# Patient Record
Sex: MALE | Race: WHITE
[De-identification: names, ages, dates, MRNs, and addresses within clinical notes are randomized per-mention and may not be internally consistent; named-entity substitution may affect disease eponyms.]

---

## 2020-12-29 ENCOUNTER — HOSPITAL ENCOUNTER (OUTPATIENT)
Dept: HOSPITAL 62 - ER | Age: 70
Setting detail: OBSERVATION
LOS: 2 days | Discharge: HOME | End: 2020-12-31
Attending: INTERNAL MEDICINE | Admitting: STUDENT IN AN ORGANIZED HEALTH CARE EDUCATION/TRAINING PROGRAM
Payer: MEDICARE

## 2020-12-29 DIAGNOSIS — Z79.899: ICD-10-CM

## 2020-12-29 DIAGNOSIS — I25.2: ICD-10-CM

## 2020-12-29 DIAGNOSIS — M62.81: ICD-10-CM

## 2020-12-29 DIAGNOSIS — Z79.82: ICD-10-CM

## 2020-12-29 DIAGNOSIS — I10: ICD-10-CM

## 2020-12-29 DIAGNOSIS — I63.9: Primary | ICD-10-CM

## 2020-12-29 DIAGNOSIS — E11.9: ICD-10-CM

## 2020-12-29 DIAGNOSIS — R20.0: ICD-10-CM

## 2020-12-29 DIAGNOSIS — I42.9: ICD-10-CM

## 2020-12-29 DIAGNOSIS — I25.10: ICD-10-CM

## 2020-12-29 DIAGNOSIS — Z95.1: ICD-10-CM

## 2020-12-29 DIAGNOSIS — I48.91: ICD-10-CM

## 2020-12-29 LAB
ADD MANUAL DIFF: NO
ALBUMIN SERPL-MCNC: 4.4 G/DL (ref 3.5–5)
ALP SERPL-CCNC: 45 U/L (ref 38–126)
ANION GAP SERPL CALC-SCNC: 11 MMOL/L (ref 5–19)
AST SERPL-CCNC: 36 U/L (ref 17–59)
BASOPHILS # BLD AUTO: 0.1 10^3/UL (ref 0–0.2)
BASOPHILS NFR BLD AUTO: 0.6 % (ref 0–2)
BILIRUB DIRECT SERPL-MCNC: 0.2 MG/DL (ref 0–0.4)
BILIRUB SERPL-MCNC: 0.8 MG/DL (ref 0.2–1.3)
BUN SERPL-MCNC: 15 MG/DL (ref 7–20)
CALCIUM: 10.2 MG/DL (ref 8.4–10.2)
CHLORIDE SERPL-SCNC: 105 MMOL/L (ref 98–107)
CO2 SERPL-SCNC: 26 MMOL/L (ref 22–30)
EOSINOPHIL # BLD AUTO: 0.1 10^3/UL (ref 0–0.6)
EOSINOPHIL NFR BLD AUTO: 1 % (ref 0–6)
ERYTHROCYTE [DISTWIDTH] IN BLOOD BY AUTOMATED COUNT: 13.7 % (ref 11.5–14)
GLUCOSE SERPL-MCNC: 97 MG/DL (ref 75–110)
HCT VFR BLD CALC: 44.8 % (ref 37.9–51)
HGB BLD-MCNC: 15 G/DL (ref 13.5–17)
INR PPP: 1.02
LYMPHOCYTES # BLD AUTO: 1.9 10^3/UL (ref 0.5–4.7)
LYMPHOCYTES NFR BLD AUTO: 20.7 % (ref 13–45)
MCH RBC QN AUTO: 30.5 PG (ref 27–33.4)
MCHC RBC AUTO-ENTMCNC: 33.5 G/DL (ref 32–36)
MCV RBC AUTO: 91 FL (ref 80–97)
MONOCYTES # BLD AUTO: 0.6 10^3/UL (ref 0.1–1.4)
MONOCYTES NFR BLD AUTO: 6.5 % (ref 3–13)
NEUTROPHILS # BLD AUTO: 6.6 10^3/UL (ref 1.7–8.2)
NEUTS SEG NFR BLD AUTO: 71.2 % (ref 42–78)
PLATELET # BLD: 230 10^3/UL (ref 150–450)
POTASSIUM SERPL-SCNC: 4.1 MMOL/L (ref 3.6–5)
PROT SERPL-MCNC: 7.7 G/DL (ref 6.3–8.2)
PROTHROMBIN TIME: 13.6 SEC (ref 11.4–15.4)
RBC # BLD AUTO: 4.91 10^6/UL (ref 4.35–5.55)
TOTAL CELLS COUNTED % (AUTO): 100 %
WBC # BLD AUTO: 9.3 10^3/UL (ref 4–10.5)

## 2020-12-29 PROCEDURE — 85025 COMPLETE CBC W/AUTO DIFF WBC: CPT

## 2020-12-29 PROCEDURE — 85610 PROTHROMBIN TIME: CPT

## 2020-12-29 PROCEDURE — 71046 X-RAY EXAM CHEST 2 VIEWS: CPT

## 2020-12-29 PROCEDURE — 93010 ELECTROCARDIOGRAM REPORT: CPT

## 2020-12-29 PROCEDURE — 36415 COLL VENOUS BLD VENIPUNCTURE: CPT

## 2020-12-29 PROCEDURE — 70551 MRI BRAIN STEM W/O DYE: CPT

## 2020-12-29 PROCEDURE — 93306 TTE W/DOPPLER COMPLETE: CPT

## 2020-12-29 PROCEDURE — 99285 EMERGENCY DEPT VISIT HI MDM: CPT

## 2020-12-29 PROCEDURE — 70450 CT HEAD/BRAIN W/O DYE: CPT

## 2020-12-29 PROCEDURE — 97116 GAIT TRAINING THERAPY: CPT

## 2020-12-29 PROCEDURE — 83036 HEMOGLOBIN GLYCOSYLATED A1C: CPT

## 2020-12-29 PROCEDURE — 84484 ASSAY OF TROPONIN QUANT: CPT

## 2020-12-29 PROCEDURE — 93880 EXTRACRANIAL BILAT STUDY: CPT

## 2020-12-29 PROCEDURE — 93005 ELECTROCARDIOGRAM TRACING: CPT

## 2020-12-29 PROCEDURE — 97161 PT EVAL LOW COMPLEX 20 MIN: CPT

## 2020-12-29 PROCEDURE — G0378 HOSPITAL OBSERVATION PER HR: HCPCS

## 2020-12-29 PROCEDURE — 80053 COMPREHEN METABOLIC PANEL: CPT

## 2020-12-29 PROCEDURE — 97165 OT EVAL LOW COMPLEX 30 MIN: CPT

## 2020-12-29 PROCEDURE — 80061 LIPID PANEL: CPT

## 2020-12-29 PROCEDURE — 82962 GLUCOSE BLOOD TEST: CPT

## 2020-12-29 PROCEDURE — 81001 URINALYSIS AUTO W/SCOPE: CPT

## 2020-12-29 NOTE — EKG REPORT
SEVERITY:- ABNORMAL ECG -

ATRIAL FIBRILLATION, V-RATE 51-87

LEFT BUNDLE BRANCH BLOCK

:

Confirmed by: Gregory Nichols MD 29-Dec-2020 21:36:18

## 2020-12-29 NOTE — ER DOCUMENT REPORT
ED Medical Screen (RME)





- General


Chief Complaint: Weakness


Stated Complaint: RIGHT LEG WEAKNESS, DIZZINESS


Time Seen by Provider: 12/29/20 16:41


Mode of Arrival: Wheelchair


Information source: Patient


Notes: 





HPI; 70-year-old male presents to the emergency room complaining of a cough for 

the past 3 days.  States today he started having some dizziness earlier today 

states he felt like the room was spinning.  States while walking to the bathroom

he felt very uneasy on his feet states he stumbled hit his right arm on a wall 

but caught himself from falling.  States ever since he has felt like his right 

leg is weak but denies pain.  States he does have a history of peripheral 

vascular disease to the right leg.  Is on blood thinners secondary to CABG.  Has

not missed any of his medications.  He denies any nausea, vomiting no chest 

pain, no shortness of breath no difficulty breathing.  States he is able to walk

but feels off balance when he walks.  He denies any COVID-19 exposure.





PE: Alert and oriented x3.  Negative fast exam.  Lungs: Clear to auscultation 

without rales, rhonchi, wheezes.  Heart: Regular rate rhythm without murmurs, 

rubs, gallops.  Able to fully lift and extend right leg without difficulty.  

Full sensation to painful stimuli bilateral upper and lower extremities.








I have greeted and performed a rapid initial assessment of this patient.  A 

comprehensive ED assessment and evaluation of the patient, analysis of test 

results and completion of the medical decision making process will be conducted 

by additional ED providers.  I have specifically instructed the patient or 

family members with the patient to immediately return to any nursing staff 

should anything change in the patient's condition or with their chief complaint.


TRAVEL OUTSIDE OF THE [place] IN LAST 30 DAYS: No





- Related Data


Allergies/Adverse Reactions: 


                                        





No Known Allergies Allergy (Unverified 12/29/20 16:33)


   











Physical Exam





- Vital signs


Vitals: 


                                        











Temp Pulse Resp BP Pulse Ox


 


 98.1 F   72   16   140/77 H  97 


 


 12/29/20 16:27  12/29/20 16:27  12/29/20 16:27  12/29/20 16:27  12/29/20 16:27














Course





- Vital Signs


Vital signs: 


                                        











Temp Pulse Resp BP Pulse Ox


 


 98.1 F   72   16   140/77 H  97 


 


 12/29/20 16:27  12/29/20 16:27  12/29/20 16:27  12/29/20 16:27  12/29/20 16:27

## 2020-12-29 NOTE — RADIOLOGY REPORT (SQ)
EXAM DESCRIPTION:  CT HEAD WITHOUT



IMAGES COMPLETED DATE/TIME:  12/29/2020 5:03 pm



REASON FOR STUDY:  dizzy



COMPARISON:  None.



TECHNIQUE:  Axial images acquired through the brain without intravenous contrast.  Images reviewed wi
th bone, brain and subdural windows.  Additional sagittal and coronal reconstructions were generated.
 Images stored on PACS.

All CT scanners at this facility use dose modulation, iterative reconstruction, and/or weight based d
osing when appropriate to reduce radiation dose to as low as reasonably achievable (ALARA).

[facility]: Dose Right  [facility]: CareDose    [facility]: Dose Right    [facility]: Teradose 4D    [facility]: Smart Tracksmith



RADIATION DOSE:  CT Rad equipment meets quality standard of care and radiation dose reduction techniq
ues were employed. CTDIvol: 53.2 mGy. DLP: 964 mGy-cm. mGy.



LIMITATIONS:  None.



FINDINGS:  VENTRICLES: Prominent.

CEREBRUM: No masses.  No hemorrhage.  No midline shift.  Areas of low density in the white matter mos
t likely due to chronic micro-vascular ischemic change.  No evidence for acute infarction.

CEREBELLUM: No masses.  No hemorrhage.  Focal encephalomalacia involving the right inferior cerebella
r hemisphere, consistent with sequela of remote ischemic injury.  No evidence for acute infarction.

EXTRAAXIAL SPACES: Mild age-related involutional change.  No fluid collections.  No masses.

ORBITS AND GLOBE: No intra- or extraconal masses.  Normal contour of globe without masses.

CALVARIUM: No fracture.

PARANASAL SINUSES: No fluid or mucosal thickening.

SOFT TISSUES: No mass or hematoma.

OTHER: No other significant finding.



IMPRESSION:  No acute intracranial abnormality.  Right cerebellar encephalomalacia, consistent with s
equela previous ischemic injury.  Background of mild chronic microvascular and age-related involution
al change.

EVIDENCE OF ACUTE STROKE: NO.



TECHNICAL DOCUMENTATION:  JOB ID:  [id number]

Quality ID # 436: Final reports with documentation of one or more dose reduction techniques (e.g., Au
tomated exposure control, adjustment of the mA and/or kV according to patient size, use of iterative 
reconstruction technique)

 2011 [employer]- All Rights Reserved



Reading location - IP/workstation name: [email or web address]

## 2020-12-29 NOTE — RADIOLOGY REPORT (SQ)
EXAM DESCRIPTION:  CHEST 2 VIEWS



IMAGES COMPLETED DATE/TIME:  12/29/2020 5:21 pm



REASON FOR STUDY:  cough



COMPARISON:  None.



EXAM PARAMETERS:  NUMBER OF VIEWS: two views

TECHNIQUE: Digital Frontal and Lateral radiographic views of the chest acquired.

RADIATION DOSE: NA

LIMITATIONS: none



FINDINGS:  LUNGS AND PLEURA: No opacities, masses or pneumothorax. No pleural effusion.

MEDIASTINUM AND HILAR STRUCTURES: Ectatic ascending aorta.

HEART AND VASCULAR STRUCTURES: Heart normal size.  No evidence for failure.

BONES: No acute findings.

HARDWARE: Midline surgical changes.

OTHER: No other significant finding.



IMPRESSION:  No evidence of acute cardiopulmonary abnormality.



TECHNICAL DOCUMENTATION:  JOB ID:  [id number]

 2011 [employer]- All Rights Reserved



Reading location - IP/workstation name: [email or web address]

## 2020-12-30 LAB
APPEARANCE UR: CLEAR
APTT PPP: YELLOW S
BILIRUB UR QL STRIP: NEGATIVE
CHOLEST SERPL-MCNC: 170.18 MG/DL (ref 0–200)
GLUCOSE UR STRIP-MCNC: >=500 MG/DL
KETONES UR STRIP-MCNC: NEGATIVE MG/DL
LDLC SERPL DIRECT ASSAY-MCNC: 135 MG/DL (ref ?–100)
NITRITE UR QL STRIP: NEGATIVE
PH UR STRIP: 5 [PH] (ref 5–9)
PROT UR STRIP-MCNC: NEGATIVE MG/DL
SP GR UR STRIP: 1.03
TRIGL SERPL-MCNC: 123 MG/DL (ref ?–150)
UROBILINOGEN UR-MCNC: NEGATIVE MG/DL (ref ?–2)
VLDLC SERPL CALC-MCNC: 25 MG/DL (ref 10–31)

## 2020-12-30 RX ADMIN — INSULIN LISPRO SCH: 100 INJECTION, SOLUTION INTRAVENOUS; SUBCUTANEOUS at 13:00

## 2020-12-30 RX ADMIN — ENALAPRIL MALEATE SCH: 5 TABLET ORAL at 22:53

## 2020-12-30 RX ADMIN — METOPROLOL SUCCINATE SCH: 25 TABLET, EXTENDED RELEASE ORAL at 22:53

## 2020-12-30 RX ADMIN — FAMOTIDINE SCH MG: 20 TABLET, FILM COATED ORAL at 11:55

## 2020-12-30 RX ADMIN — INSULIN LISPRO SCH: 100 INJECTION, SOLUTION INTRAVENOUS; SUBCUTANEOUS at 16:19

## 2020-12-30 RX ADMIN — INSULIN LISPRO SCH: 100 INJECTION, SOLUTION INTRAVENOUS; SUBCUTANEOUS at 10:02

## 2020-12-30 RX ADMIN — CILOSTAZOL SCH: 100 TABLET ORAL at 22:53

## 2020-12-30 RX ADMIN — FAMOTIDINE SCH: 20 TABLET, FILM COATED ORAL at 22:53

## 2020-12-30 RX ADMIN — ENOXAPARIN SODIUM SCH MG: 40 INJECTION SUBCUTANEOUS at 11:55

## 2020-12-30 RX ADMIN — INSULIN LISPRO SCH: 100 INJECTION, SOLUTION INTRAVENOUS; SUBCUTANEOUS at 22:52

## 2020-12-30 RX ADMIN — MEMANTINE HYDROCHLORIDE SCH: 10 TABLET, FILM COATED ORAL at 19:24

## 2020-12-30 RX ADMIN — ASPIRIN SCH MG: 81 TABLET, CHEWABLE ORAL at 11:55

## 2020-12-30 NOTE — XCELERA REPORT
[address] [address]

                               Tel: [phone]

                               Fax: [phone]



                      Transthoracic Echocardiogram Report

_______________________________________________________________________________



Name: AMANDA GALVAN

MRN: [id number]                           Age: 70 yrs

Gender: Male                              : 1950

Patient Status: Inpatient                 Patient Location: [address] [place] [address]

Account #: [id number]

Study Date: 2020 01:39 PM

History: SHARIF

Accession #: [id number]

_______________________________________________________________________________



Height: 69 in        Weight: 180 lb        BSA: 2.0 m2

_______________________________________________________________________________

Procedure: A complete two-dimensional transthoracic echocardiogram was

performed (2D, M-mode, spectral and color flow Doppler). The study was

technically difficult with many images being suboptimal in quality.

Reason For Study: TIA

Previous Evaluation: No previous studies were available.

History: TIA.



Ordering Physician: MED LEGGETT

Performed By: Dejah Shabazz

_______________________________________________________________________________



Interpretation Summary

Left ventricular systolic function is mild to moderately reduced.

The Ejection Fraction estimate is 35-40%

The right ventricular systolic function is normal.

There is a mild amount of mitral regurgitation

A bicuspid aortic valve cannot be excluded.

There is no aortic valve stenosis

There is a trace amount of aortic regurgitation

There is a trace amount of tricuspid regurgitation

There is no pericardial effusion.



MMode/2D Measurements & Calculations



RVDd: 2.4 cm  LVIDd: 6.4 cm   FS: 20.1 %             Ao root diam: 3.3 cm

IVSd: 1.2 cm  LVIDs: 5.1 cm   EDV(Teich): 206.9 ml   Ao root area: 8.7 cm2

              LVPWd: 1.1 cm   ESV(Teich): 123.6 ml

                              EF(Teich): 40.3 %



Doppler Measurements & Calculations

MV E max opal:       MV dec slope:        Ao V2 max:         LV V1 max P.2 cm/sec         502.3 cm/sec2        107.9 cm/sec       1.8 mmHg

                    MV dec time: 0.16 secAo max PG:         LV V1 max:

                                         4.7 mmHg           67.4 cm/sec

        _______________________________________________________________

PA V2 max:

66.5 cm/sec

PA max P.8 mmHg





Left Ventricle

The left ventricle is moderately dilated. There is mild to moderate concentric

left ventricular hypertrophy. Left ventricular systolic function is mild to

moderately reduced. The Ejection Fraction estimate is 35-40%. There is

anterior wall akinesis. There is proximal anterior wall akinesis. There is mid

to distal anterior wall akinesis.



Right Ventricle

The right ventricle is grossly normal size. The right ventricular systolic

function is normal.



Atria

The right atrium is mildly dilated. The left atrium is mildly dilated. The

interatrial septum is intact with no evidence for an atrial septal defect.

There is no Doppler evidence for an interatrial shunt.



Mitral Valve

The mitral valve is grossly normal. There is no mitral valve stenosis. There

is a mild amount of mitral regurgitation.





Aortic Valve

A bicuspid aortic valve cannot be excluded. There is no aortic valve stenosis.

There is a trace amount of aortic regurgitation.



Tricuspid Valve

The tricuspid valve is normal in structure and function. There is a trace

amount of tricuspid regurgitation. Tricuspid regurgitation jet envelope not

well defined to measure RV systolic pressure accurately.



Pulmonic Valve

The pulmonic valve is normal in structure and function. There is no pulmonic

valvular stenosis. There is a trace or physiologic amount of pulmonic

regurgitation.



Great Vessels

The aortic root is normal size. The inferior vena cava appeared normal and

decreased < 50% with respiration (RAP 10-15 mmHg).





Effusions

There is no pericardial effusion.



_______________________________________________________________________________

_______________________________________________________________________________



Electronically signed by:      Gregory Nichols      on 2020 06:29 PM



CC: MDE LEGGETT Anil

## 2020-12-30 NOTE — PDOC H&P
History of Present Illness


Admission Date/PCP: 


  





  JEVON TOTH





Patient complains of: Dizziness, right lower extremity heaviness and numbness


History of Present Illness: 


AMANDA GALVAN is a 70 year old male with a history of CAD status post CABG in 

2006, type 2 diabetes, hypertension who now presents reporting dizziness which 

started around 9 AM yesterday morning.  Associated with this patient also 

reports that he felt some weakness/heaviness of the right lower extremity.  He 

also endorsed numbness and tingling on the right lower extremity.  He states 

that the weakness of his right lower extremity lasted for about an hour and 

resolved spontaneously but he continued having dizziness which he describes as 

floating of his head and loss of balance.  And when he was trying to walk to the

bathroom, he lost his balance and fell down but he denies any passing out or 

loss of consciousness and he did not hit his head.  He denies any change in his 

speech and does not have any difficulty of swallowing.  Patient denies any 

similar symptoms in the past.  He denies chest pain, shortness of breath, 

orthopnea, PND, palpitation, loss of control of bowel or bladder function.  

Currently all symptoms have resolved.








Past Medical History


Cardiac Medical History: Reports: Atrial Fibrillation, Coronary Artery Disease, 

Myocardial Infarction, Hypertension


Pulmonary Medical History: Reports: None


EENT Medical History: Reports: None


Neurological Medical History: Reports: None


Endocrine Medical History: Reports: None, Diabetes Mellitus Type 2


Renal/ Medical History: Reports: None


Malignancy Medical History: Reports: None


GI Medical History: Reports: None


Musculoskeltal Medical History: Reports: None


Skin Medical History: Reports: None


Psychiatric Medical History: Reports: None


Traumatic Medical History: Reports: None


Infectious Medical History: Reports: None





Past Surgical History


Past Surgical History: Reports: None





Social History


Information Source: Patient


Lives with: Family


Smoking Status: Never Smoker


Frequency of Alcohol Use: None


Hx Recreational Drug Use: No


Drugs: None





- Advance Directive


Resuscitation Status: Full Code





Family History


Family History: Reviewed & Not Pertinent


Parental Family History Reviewed: Yes


Children Family History Reviewed: Yes


Sibling(s) Family History Reviewed.: Yes





Medication/Allergy


Allergies/Adverse Reactions: 


                                        





morphine Adverse Reaction (Verified 12/30/20 06:40)


   Irritability











Review of Systems


Constitutional: ABSENT: chills, fever(s), headache(s), weight gain, weight loss


Eyes: ABSENT: visual disturbances


Ears: ABSENT: hearing changes


Nose, Mouth, and Throat: ABSENT: headache(s), mouth pain, sore throat


Cardiovascular: PRESENT: as per HPI


Respiratory: ABSENT: cough, hemoptysis


Gastrointestinal: ABSENT: abdominal pain, constipation, diarrhea, hematemesis, 

hematochezia, nausea, vomiting


Genitourinary: ABSENT: dysuria, hematuria


Musculoskeletal: ABSENT: joint swelling


Integumentary: ABSENT: rash, wounds


Neurological: PRESENT: as per HPI


Psychiatric: ABSENT: anxiety, depression, homidical ideation, suicidal ideation


Endocrine: ABSENT: cold intolerance, heat intolerance, polydipsia, polyuria


Hematologic/Lymphatic: ABSENT: easy bleeding, easy bruising





Physical Exam


Vital Signs: 


                                        











Temp Pulse Resp BP Pulse Ox


 


 98.1 F   67   13   137/85 H  97 


 


 12/30/20 04:05  12/30/20 03:00  12/30/20 05:01  12/30/20 05:00  12/30/20 05:01











Additional comments: 





GENERAL APPEARANCE: Alert and oriented x3, in no acute distress


HEENT: Normocephalic and atraumatic. No scleral icterus.  Moist oral mucosa


NECK: Supple. No lymphadenopathy or tenderness. No carotid bruit. No JVD


CHEST: Symmetric. Nontender to palpation.


LUNGS: Clear with good air entry bilaterally. No wheezing or crackles 


HEART: Regular rate and rhythm with normal S1 and S2. No murmurs, gallops, or 

rubs.


ABDOMEN: soft, active bowel sounds, no direct or rebound tenderness.  No 

organomegaly detected.  


EXTREMITIES: No cyanosis, clubbing, or edema.


MUSCULOSKELETAL: No deformity, atrophy or swelling noted


PSYCHIATRIC:  Recent and remote memory is intact. Appropriate mood and affect.


SKIN: Warm, dry, and well perfused. No lesions or rashes are noted. 


NEUROLOGIC: Cranial nerves II through XII were grossly intact


                   Motor: Power was 5/5 in all 4 extremities


                  Sensory: Intact sensation to light and deep touch in all 

extremities symmetrically


                  Reflex: +2 symmetrically in both upper and lower extremities 

at knee, ankle biceps and wrist











Results


Laboratory Results: 


                                        





                                 12/29/20 16:02 





                                 12/29/20 16:02 





                                        











  12/29/20 12/29/20 12/29/20





  16:02 16:02 22:44


 


WBC  9.3  


 


RBC  4.91  


 


Hgb  15.0  


 


Hct  44.8  


 


MCV  91  


 


MCH  30.5  


 


MCHC  33.5  


 


RDW  13.7  


 


Plt Count  230  


 


Seg Neutrophils %  71.2  


 


Sodium   141.5 


 


Potassium   4.1 


 


Chloride   105 


 


Carbon Dioxide   26 


 


Anion Gap   11 


 


BUN   15 


 


Creatinine   0.95 


 


Est GFR ( Amer)   > 60 


 


Glucose   97 


 


Calcium   10.2 


 


Total Bilirubin   0.8 


 


AST   36 


 


Alkaline Phosphatase   45 


 


Total Protein   7.7 


 


Albumin   4.4 


 


Urine Color    YELLOW


 


Urine Appearance    CLEAR


 


Urine pH    5.0


 


Ur Specific Gravity    1.028


 


Urine Protein    NEGATIVE


 


Urine Glucose (UA)    >=500 H


 


Urine Ketones    NEGATIVE


 


Urine Blood    NEGATIVE


 


Urine Nitrite    NEGATIVE


 


Ur Leukocyte Esterase    NEGATIVE


 


Urine WBC (Auto)    1


 


Urine RBC (Auto)    1








                                        











  12/29/20





  16:02


 


Troponin I  0.014











Impressions: 


                                        





Chest X-Ray  12/29/20 16:49


IMPRESSION:  No evidence of acute cardiopulmonary abnormality.


 








Head CT  12/29/20 16:49


IMPRESSION:  No acute intracranial abnormality.  Right cerebellar 

encephalomalacia, consistent with sequela previous ischemic injury.  Background 

of mild chronic microvascular and age-related involutional change.


EVIDENCE OF ACUTE STROKE: NO.


 














Assessment and Plan





- Diagnosis


(1) TIA (transient ischemic attack)


Is this a current diagnosis for this admission?: Yes   


Plan: 


Patient presents with dizziness and right lower extremity weakness which has 

resolved since


Was out of window period On presentation


Head CT without contrast showed no acute intracranial changes


Obtain MRI head, carotid Doppler and echocardiogram


Kept him n.p.o. until patient completes swallow eval


PT/OT/speech evaluation consult placed


Placed him on telemetry monitoring


Fall precaution, neurochecks, aspiration precaution


Will get lipid panel in the a.m.


Started him on aspirin and atorvastatin











(2) New onset a-fib


Is this a current diagnosis for this admission?: Yes   


Plan: 


Patient denies any history of A. fib in the past


Maybe contributing to his current symptoms including dizziness and strokelike 

symptoms


EKG on this presentation shows A. fib which is rate controlled


Has a [id number] 2 score of 4


We will obtain echo cardiogram in the morning


Likely to be started on anticoagulation before discharge











(3) Type 2 diabetes mellitus


Is this a current diagnosis for this admission?: Yes   


Plan: 


On diabetic diet


Sliding scale insulin, Accu-Chek and hypoglycemia protocol








(4) Hypertension


Is this a current diagnosis for this admission?: Yes   


Plan: 


Blood pressure is within acceptable range


Hold off BP medications for now to allow permissive hypertension











(5) CAD (coronary artery disease)


Is this a current diagnosis for this admission?: Yes   


Plan: 


Currently denies any chest pain


Continue aspirin and statin








- Time


Time Spent with patient: 35 or more minutes


Total Critical Time (Minutes): 45


Medications reviewed and adjusted accordingly: Yes


Anticipated Discharge Disposition: Home, Self Care


Anticipated Discharge Timeframe: within 48 hours





- Inpatient Certification


Based on my medical assessment, after consideration of the patient's 

comorbidities, presenting symptoms, or acuity I expect that the services needed 

warrant INPATIENT care.: Yes


I certify that my determination is in accordance with my understanding of 

Medicare's requirements for reasonable and necessary INPATIENT services [42 CFR 

412.3e].: Yes


Medical Necessity: Significant Comorbidiites Make Outpatient Treatment Too Ris

ky, Need Close Monitoring Due to Risk of Patient Decompensation, Need For 

Continuous Telemetry Monitoring, Need for Neurological Checks


Post Hospital Care: D/C or Transfer Summary

## 2020-12-30 NOTE — RADIOLOGY REPORT (SQ)
EXAM DESCRIPTION:  CAROTID DOPPLER



IMAGES COMPLETED DATE/TIME:  12/30/2020 3:20 pm



REASON FOR STUDY:  Transient ischemic attack



COMPARISON:  None.



TECHNIQUE:  Grayscale ultrasound, Doppler velocity and spectra, and color Doppler images acquired of 
the extra-cranial carotid and vertebral arteries. Images stored on PACS.



LIMITATIONS:  None.



FINDINGS:  RIGHT CAROTID

CCA Velocities: Within normal limits.

ICA Velocities

 Peak systolic 71  cm/s.

 End diastolic 23  cm/s.

Proximal ICA/CCA peak systolic ratio 1.2.

Spectra normal. No significant plaque.

LEFT CAROTID

CCA Velocities: Within normal limits.

ICA Velocities

 Peak systolic 91  cm/s.

 End diastolic 20  cm/s.

Proximal ICA/CCA peak systolic ratio 1.1.

Spectra normal. No significant plaque.

VERTEBRAL ARTERIES: Antegrade flow.  Normal waveforms.

SUBCLAVIAN ARTERIES: No finding.

OTHER: Tortuous internal carotid arteries bilaterally.



IMPRESSION:  Tortuous ICAs.  No hemodynamically significant stenosis.



COMMENT:  Quality ID #195:  Velocity criteria are extrapolated from the diameter data as defined by t
he Society of Radiologists in Ultrasound Consensus Conference. Radiology 2003: 229; 340-346.



TECHNICAL DOCUMENTATION:  JOB ID:  [id number]

 2011 [employer]- All Rights Reserved



Reading location - IP/workstation name: [email or web address]

## 2020-12-30 NOTE — RADIOLOGY REPORT (SQ)
EXAM DESCRIPTION:  MRI HEAD WITHOUT



IMAGES COMPLETED DATE/TIME:  12/30/2020 7:58 am



REASON FOR STUDY:  TIA



COMPARISON:  CT brain dated 12/29/2020



TECHNIQUE:  Multiplanar imaging includes non-contrasted T1, T2, FLAIR, and diffusion with ADC map seq
uences.  Images stored on PACS.



LIMITATIONS:  None.



FINDINGS:  ANATOMY: No anomalies. Normal vascular flow voids. Pituitary fossa normal.

CSF SPACES: Atrophy induced prominence of ventricles and CSF spaces.

CEREBRUM: Scattered periventricular and subcortical white matter lesions consistent with small vessel
 disease.  No focal mass or intracranial hemorrhage.

POSTERIOR FOSSA: Old left cerebellar infarct.

DIFFUSION IMAGING: Focal small 3 to 4 mm area of high signal intensity on diffusion-weighted images w
ith corresponding decreased signal intensity on apparent diffusion images consistent with small infar
ct.  This is best demonstrated on series 4, image 20 of 54 and series 400, image 20 of 27.

ORBITS: No masses. Globes normal.

PARANASAL  SINUSES: No fluid levels.  Mucosa normal.

OTHER: No other significant finding.



IMPRESSION:  1. Small acute infarct in the left centrum semiovale.  This measures only 3 to 4 mm in g
reatest diameter.  It demonstrates abnormal signal on diffusion weighted sequences.

2.  Diffuse cerebral atrophy and mild small vessel ischemic changes.

EVIDENCE OF ACUTE STROKE: YES.



TECHNICAL DOCUMENTATION:  JOB ID:  [id number]

 [employer]- All Rights Reserved



Reading location - IP/workstation name: [email or web address]

## 2020-12-30 NOTE — ER DOCUMENT REPORT
ED General





- General


Chief Complaint: Numbness


Stated Complaint: RIGHT LEG WEAKNESS, DIZZINESS


Time Seen by Provider: 12/29/20 16:41


Mode of Arrival: Wheelchair


Information source: Patient


Notes: 





Patient presents to the ER for evaluation of sudden onset of dizziness with poor

depth perception and right leg heaviness that began approximately 1 hour prior 

to arrival.  According to the patient, the symptoms had resolved by the time he 

got to the hospital.  The pit note indicates that he felt like the room was 

spinning but he tells me that this is not the case.  He states he feels as if 

the room was moving up and down rapidly.  The patient initially said he was on 

blood thinners but later states that his medication is metoprolol and is not a 

blood thinner.  He has had an MI and does have a history of a CABG.  He does not

have a history of atrial fibrillation.  He denies shortness of breath.  He 

denies chest pain.  He denies nausea or vomiting.  He denies fever.  He says he 

has had a mild cough intermittently over the last several days but that this has

not been a significant issue.  No known COVID-19 exposures.





Nursing notes reviewed and past medical, social, and family histories reviewed 

and validated.


TRAVEL OUTSIDE OF THE [place] IN LAST 30 DAYS: No





- Related Data


Allergies/Adverse Reactions: 


                                        





No Known Allergies Allergy (Verified 12/29/20 20:20)


   











Past Medical History





- General


Information source: Patient





- Social History


Smoking Status: Never Smoker


Cigarette use (# per day): No


Chew tobacco use (# tins/day): No


Frequency of alcohol use: None


Drug Abuse: None


Lives with: Family


Family History: Reviewed & Not Pertinent


Patient has suicidal ideation: No


Patient has homicidal ideation: No





- Past Medical History


Cardiac Medical History: Reports: Hx Coronary Artery Disease, Hx Heart Attack, 

Hx Hypertension


Pulmonary Medical History: Reports: None


EENT Medical History: Reports: None


Neurological Medical History: Reports: None


Endocrine Medical History: Reports: Hx Diabetes Mellitus Type 2


Renal/ Medical History: Reports: None


Malignancy Medical History: Reports None


GI Medical History: Reports: None


Musculoskeletal Medical History: Reports None


Skin Medical History: Reports None


Psychiatric Medical History: Reports: None


Traumatic Medical History: Reports: None


Infectious Medical History: Reports: None


Past Surgical History: Reports: None





- Immunizations


Immunizations up to date: Yes


Hx Diphtheria, Pertussis, Tetanus Vaccination: Yes





Review of Systems





- Review of Systems


Notes: 





Constitutional: Negative for fever.


HENT: Negative for sore throat.


Eyes: Negative for visual changes.


Cardiovascular: Negative for chest pain.


Respiratory: Negative for shortness of breath.


Gastrointestinal: Negative for abdominal pain, vomiting or diarrhea.


Genitourinary: Negative for dysuria.


Musculoskeletal: Negative for back pain.


Skin: Negative for rash.


Neurological: Positive for unilateral weakness.  Positive for dizziness.





10 point ROS negative except as marked above and in HPI.





Physical Exam





- Vital signs


Vitals: 


                                        











Temp Pulse Resp BP Pulse Ox


 


 98.1 F   72   16   140/77 H  97 


 


 12/29/20 16:27  12/29/20 16:27  12/29/20 16:27  12/29/20 16:27  12/29/20 16:27














- Notes


Notes: 








CONSTITUTIONAL: Well appearing. No acute distress.


SKIN: Warm, dry, and intact without rash


EYES: Extraocular movements are grossly intact, clear conjunctiva


HENT: Normocephalic, atraumatic, moist mucus membranes


NECK: No obvious swelling, normal range of motion


PULMONARY: Normal chest rise and fall.  Breath sounds clear and equal 

bilaterally. No respiratory distress or stridor


CARDIOVASCULAR: Regular rate.  No murmurs, rubs, gallops. Distal extremities are

warm and well perfused.


ABDOMINAL:  Soft, nontender


NEUROLOGIC: Normal speech, moves all extremities.  Cranial nerves are within 

normal limits.   strength strong and equal in the upper extremities 

bilaterally.  There is good strength and sensation in bilateral lower 

extremities.  There is no arm or leg drift noted.


MUSCULOSKELETAL: No gross deformities, atraumatic


PSYCHIATRIC: Normal mood and affect








Course





- Re-evaluation


Re-evalutation: 





12/30/20 04:17


This case was discussed with Dr. Shelley who agrees to evaluate the patient in 

the emergency room for admission.








- Vital Signs


Vital signs: 


                                        











Temp Pulse Resp BP Pulse Ox


 


 98.1 F   67   13   137/85 H  97 


 


 12/30/20 04:05  12/30/20 03:00  12/30/20 05:01  12/30/20 05:00  12/30/20 05:01














- Laboratory Results


Result Diagrams: 


                                 12/29/20 16:02





                                 12/29/20 16:02


Laboratory Results Interpreted: 


                                        











  12/29/20





  22:44


 


Urine Glucose (UA)  >=500 H


 


Urine Ascorbic Acid  40 H











Critical Laboratory Results Reviewed: No Critical Results





- Radiology Results


Critical Radiology Results Reviewed: No Critical Results





Discharge





- Discharge


Clinical Impression: 


 New onset a-fib, TIA (transient ischemic attack)





Condition: Stable


Disposition: ADMITTED AS INPATIENT

## 2020-12-31 VITALS — DIASTOLIC BLOOD PRESSURE: 67 MMHG | SYSTOLIC BLOOD PRESSURE: 112 MMHG

## 2020-12-31 RX ADMIN — FAMOTIDINE SCH: 20 TABLET, FILM COATED ORAL at 10:47

## 2020-12-31 RX ADMIN — CILOSTAZOL SCH: 100 TABLET ORAL at 10:47

## 2020-12-31 RX ADMIN — METOPROLOL SUCCINATE SCH: 25 TABLET, EXTENDED RELEASE ORAL at 10:47

## 2020-12-31 RX ADMIN — MEMANTINE HYDROCHLORIDE SCH: 10 TABLET, FILM COATED ORAL at 10:47

## 2020-12-31 RX ADMIN — ENALAPRIL MALEATE SCH: 5 TABLET ORAL at 10:48

## 2020-12-31 RX ADMIN — INSULIN LISPRO SCH: 100 INJECTION, SOLUTION INTRAVENOUS; SUBCUTANEOUS at 08:40

## 2020-12-31 RX ADMIN — ENOXAPARIN SODIUM SCH: 40 INJECTION SUBCUTANEOUS at 10:47

## 2020-12-31 RX ADMIN — ASPIRIN SCH: 81 TABLET, CHEWABLE ORAL at 10:46

## 2020-12-31 NOTE — PDOC DISCHARGE SUMMARY
Impression





- Admit/DC Date/PCP


Admission Date/Primary Care Provider: 


  12/30/20 05:23





  JEVON TOTH





Discharge Date: 12/31/20





- Discharge Diagnosis


(1) Acute CVA (cerebrovascular accident)


Is this a current diagnosis for this admission?: Yes   





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


Is this a current diagnosis for this admission?: Yes   





(3) Atrial fibrillation


Is this a current diagnosis for this admission?: Yes   





(4) Cardiomyopathy


Is this a current diagnosis for this admission?: Yes   





(5) CAD (coronary artery disease)


Is this a current diagnosis for this admission?: Yes   





(6) Hypertension


Is this a current diagnosis for this admission?: Yes   





(7) Type 2 diabetes mellitus


Is this a current diagnosis for this admission?: Yes   





- Additional Information


Resuscitation Status: Full Code


Discharge Diet: Cardiac, Diabetic


Discharge Activity: Slowly Increase Activity


Referrals: 


ANGELINA FIGUEROA MD [ASSOCIATE] - 


ARELIS HARPER PA [Primary Care Provider] - Follow up as needed


Prescriptions: 


Apixaban [Eliquis 5 mg Tablet] 5 mg PO Q12 #60 tablet


Atorvastatin Calcium [Lipitor 40 mg Tablet] 40 mg PO QHS #30 tablet


Home Medications: 








Aspirin [Ecotrin 81 mg EC Tablet] 81 mg PO DAILY 12/30/20 


Cilostazol [Pletal 100 mg Tablet] 100 mg PO BID 12/30/20 


Empagliflozin [Jardiance] 25 mg PO DAILY 12/30/20 


Enalapril Maleate [Vasotec 5 mg Tablet] 5 mg PO BID 12/30/20 


Ergocalciferol (Vitamin D2) [Drisdol 50,000 unit (1.25MG) Capsule] 50,000 unit 

PO FR@1000 12/30/20 


Glimepiride [Amaryl 4 mg Tablet] 4 mg PO BID 12/30/20 


Memantine HCl [Namenda 10 mg Tablet] 10 mg PO BID 12/30/20 


Metoprolol Succinate [Toprol Xl 50 mg Tab.sr] 25 mg PO Q12 12/30/20 


Rivastigmine Tartrate [Exelon 1.5 mg Capsule] 1.5 mg PO QHS 12/30/20 


Saxagliptin HCl/Metformin HCl [Kombiglyze Xr 2.5-1,000 mg Tab] 1 tab PO BID 12 /30/20 


Tamsulosin HCl [Flomax 0.4 mg Cap.sr] 0.4 mg PO DAILY 12/30/20 


Vitamin B Complex [Super B-50 Complex] 1 tab PO DAILY 12/30/20 


Apixaban [Eliquis 5 mg Tablet] 5 mg PO Q12 #60 tablet 12/31/20 


Atorvastatin Calcium [Lipitor 40 mg Tablet] 40 mg PO QHS #30 tablet 12/31/20 











History of Present Illiness


History of Present Illness: 


According to admitting provider:


AMANDA GALVAN is a 70 year old male with a history of CAD status post CABG in 

2006, type 2 diabetes, hypertension who now presents reporting dizziness which 

started around 9 AM yesterday morning.  Associated with this patient also 

reports that he felt some weakness/heaviness of the right lower extremity.  He 

also endorsed numbness and tingling on the right lower extremity.  He states ladan

t the weakness of his right lower extremity lasted for about an hour and 

resolved spontaneously but he continued having dizziness which he describes as 

floating of his head and loss of balance.  And when he was trying to walk to the

bathroom, he lost his balance and fell down but he denies any passing out or 

loss of consciousness and he did not hit his head.  He denies any change in his 

speech and does not have any difficulty of swallowing.  Patient denies any 

similar symptoms in the past.  He denies chest pain, shortness of breath, 

orthopnea, PND, palpitation, loss of control of bowel or bladder function.  

Currently all symptoms have resolved.








Hospital Course


Hospital Course: 


Patient was admitted to the hospital for evaluation of dizziness and right lower

extremity weakness.  Patient notably has had a prior stroke which involved his 

left cerebellum.  He volunteers that the residuals from this involved difficulty

with memories.  On this occasion, his new stroke is in the left centrum 

semiovale of his left hemisphere.  It is small in size.  Stroke work-up included

carotid ultrasound which revealed a tortuous carotid no evidence of hemodynami

oni significant stenosis.  Echocardiogram did not show any intramural 

thrombus.  Telemetry review shows that he is in atrial fibrillation type is 

unspecified as to whether this is longstanding/persistent/paroxysmal.  He does 

state that he has been told that he has a history of irregular heart rhythm 

before.  He thinks he was atrial fibrillation.  He sees his cardiologist Dr. Salcedo at [facility] in 2 days.  I have discussed with him that A. fib puts 

him at elevated risk of having recurrent strokes and as such we will start him 

on Eliquis.  He denies any history of any significant or major bleeding.  I told

him to also discuss this new medication with his cardiologist upon his next 

visit.  He is to continue aspirin lifelong.  His residuals today quite minimal 

and he is ambulating through the hallway without any difficulty whatsoever.  He 

has been recommended for outpatient physical therapy by PT has evaluated 

patient.  Patient also has hyperlipidemia with LDL of 135.  I have started him 

on atorvastatin and I have discontinued his fenofibrate as his triglycerides are

normal.  His echocardiogram also picked up evidence of cardiomyopathy with 

ejection fraction of 35 to 40%.  He denies history of congestive heart failure. 

On examination today he does not seem to be in acute heart failure.  He is 

already currently on enalapril and Toprol XL.  I have given him a copy of his 

echocardiogram results to take with him to see his cardiologist in 2 days.  He 

is to continue his current cardiac medications.  No need for any diuretics at 

this time.  Patient is currently stable and ready for discharge.





Physical Exam


Vital Signs: 


                                        











Temp Pulse Resp BP Pulse Ox


 


 98.3 F   87   18   112/67   100 


 


 12/31/20 07:51  12/31/20 07:51  12/31/20 07:51  12/31/20 07:51  12/31/20 07:51








                                 Intake & Output











 12/30/20 12/31/20 01/01/21





 06:59 06:59 06:59


 


Intake Total  670 


 


Balance  670 


 


Weight 83.9 kg 83.9 kg 











General appearance: PRESENT: no acute distress, cooperative


Neck exam: ABSENT: JVD


Respiratory exam: PRESENT: clear to auscultation yordan.  ABSENT: crackles


Cardiovascular exam: PRESENT: irregular rhythm, +S1, +S2.  ABSENT: tachycardia


GI/Abdominal exam: PRESENT: soft.  ABSENT: tenderness


Musculoskeletal exam: PRESENT: ambulatory


Neurological exam: PRESENT: alert, awake, oriented to person, oriented to place,

oriented to time, oriented to situation, CN II-XII grossly intact, motor sensory

deficit - very mild RLE weakness, normal gait.  ABSENT: abnormal gait, ataxia


Psychiatric exam: ABSENT: agitated, anxious





Results


Laboratory Results: 


                                        











WBC  9.3 10^3/uL (4.0-10.5)   12/29/20  16:02    


 


RBC  4.91 10^6/uL (4.35-5.55)   12/29/20  16:02    


 


Hgb  15.0 g/dL (13.5-17.0)   12/29/20  16:02    


 


Hct  44.8 % (37.9-51.0)   12/29/20  16:02    


 


MCV  91 fl (80-97)   12/29/20  16:02    


 


MCH  30.5 pg (27.0-33.4)   12/29/20  16:02    


 


MCHC  33.5 g/dL (32.0-36.0)   12/29/20  16:02    


 


RDW  13.7 % (11.5-14.0)   12/29/20  16:02    


 


Plt Count  230 10^3/uL (150-450)   12/29/20  16:02    


 


Lymph % (Auto)  20.7 % (13-45)   12/29/20  16:02    


 


Mono % (Auto)  6.5 % (3-13)   12/29/20  16:02    


 


Eos % (Auto)  1.0 % (0-6)   12/29/20  16:02    


 


Baso % (Auto)  0.6 % (0-2)   12/29/20  16:02    


 


Absolute Neuts (auto)  6.6 10^3/uL (1.7-8.2)   12/29/20  16:02    


 


Absolute Lymphs (auto)  1.9 10^3/uL (0.5-4.7)   12/29/20  16:02    


 


Absolute Monos (auto)  0.6 10^3/uL (0.1-1.4)   12/29/20  16:02    


 


Absolute Eos (auto)  0.1 10^3/uL (0.0-0.6)   12/29/20  16:02    


 


Absolute Basos (auto)  0.1 10^3/uL (0.0-0.2)   12/29/20  16:02    


 


Seg Neutrophils %  71.2 % (42-78)   12/29/20  16:02    


 


PT  13.6 SEC (11.4-15.4)   12/29/20  16:02    


 


INR  1.02   12/29/20  16:02    


 


Sodium  141.5 mmol/L (137-145)   12/29/20  16:02    


 


Potassium  4.1 mmol/L (3.6-5.0)   12/29/20  16:02    


 


Chloride  105 mmol/L ()   12/29/20  16:02    


 


Carbon Dioxide  26 mmol/L (22-30)   12/29/20  16:02    


 


Anion Gap  11  (5-19)   12/29/20  16:02    


 


BUN  15 mg/dL (7-20)   12/29/20  16:02    


 


Creatinine  0.95 mg/dL (0.52-1.25)   12/29/20  16:02    


 


Est GFR ( Amer)  > 60  (>60)   12/29/20  16:02    


 


Est GFR (MDRD) Non-Af  > 60  (>60)   12/29/20  16:02    


 


Glucose  97 mg/dL ()   12/29/20  16:02    


 


POC Glucose  240 mg/dL ()  H  12/31/20  07:33    


 


Hemoglobin A1c %  7.4 % (4.7-6.0)  H  12/30/20  08:15    


 


Calcium  10.2 mg/dL (8.4-10.2)   12/29/20  16:02    


 


Total Bilirubin  0.8 mg/dL (0.2-1.3)   12/29/20  16:02    


 


Direct Bilirubin  0.2 mg/dL (0.0-0.4)   12/29/20  16:02    


 


Neonat Total Bilirubin  Not Reportable   12/29/20  16:02    


 


Neonat Direct Bilirubin  Not Reportable   12/29/20  16:02    


 


Neonat Indirect Bili  Not Reportable   12/29/20  16:02    


 


AST  36 U/L (17-59)   12/29/20  16:02    


 


ALT  40 U/L (<50)   12/29/20  16:02    


 


Alkaline Phosphatase  45 U/L ()   12/29/20  16:02    


 


Troponin I  < 0.012 ng/mL  12/30/20  11:57    


 


Total Protein  7.7 g/dL (6.3-8.2)   12/29/20  16:02    


 


Albumin  4.4 g/dL (3.5-5.0)   12/29/20  16:02    


 


Triglycerides  123 mg/dL (<150)   12/30/20  08:15    


 


Cholesterol  170.18 mg/dL (0-200)   12/30/20  08:15    


 


LDL Cholesterol Direct  135 mg/dL (<100)  H  12/30/20  08:15    


 


VLDL Cholesterol  25.0 mg/dL (10-31)   12/30/20  08:15    


 


HDL Cholesterol  35 mg/dL (>40)  L  12/30/20  08:15    


 


Urine Color  YELLOW   12/29/20  22:44    


 


Urine Appearance  CLEAR   12/29/20  22:44    


 


Urine pH  5.0  (5.0-9.0)   12/29/20  22:44    


 


Ur Specific Gravity  1.028   12/29/20  22:44    


 


Urine Protein  NEGATIVE mg/dL (NEGATIVE)   12/29/20  22:44    


 


Urine Glucose (UA)  >=500 mg/dL (NEGATIVE)  H  12/29/20  22:44    


 


Urine Ketones  NEGATIVE mg/dL (NEGATIVE)   12/29/20  22:44    


 


Urine Blood  NEGATIVE  (NEGATIVE)   12/29/20  22:44    


 


Urine Nitrite  NEGATIVE  (NEGATIVE)   12/29/20  22:44    


 


Urine Bilirubin  NEGATIVE  (NEGATIVE)   12/29/20  22:44    


 


Urine Urobilinogen  NEGATIVE mg/dL (<2.0)   12/29/20  22:44    


 


Ur Leukocyte Esterase  NEGATIVE  (NEGATIVE)   12/29/20  22:44    


 


Urine WBC (Auto)  1 /HPF  12/29/20  22:44    


 


Urine RBC (Auto)  1 /HPF  12/29/20  22:44    


 


Urine Mucus (Auto)  RARE /LPF  12/29/20  22:44    


 


Urine Ascorbic Acid  40  (NEGATIVE)  H  12/29/20  22:44    








                                        











  12/29/20 12/30/20 12/30/20





  16:02 08:15 11:57


 


Troponin I  0.014  < 0.012  < 0.012











Impressions: 


                                        





Chest X-Ray  12/29/20 16:49


IMPRESSION:  No evidence of acute cardiopulmonary abnormality.


 








Head CT  12/29/20 16:49


IMPRESSION:  No acute intracranial abnormality.  Right cerebellar 

encephalomalacia, consistent with sequela previous ischemic injury.  Background 

of mild chronic microvascular and age-related involutional change.


EVIDENCE OF ACUTE STROKE: NO.


 








Carotid Doppler Study  12/30/20 00:00


IMPRESSION:  Tortuous ICAs.  No hemodynamically significant stenosis.


 








Head MRI  12/30/20 00:00


IMPRESSION:  1. Small acute infarct in the left centrum semiovale.  This 

measures only 3 to 4 mm in greatest diameter.  It demonstrates abnormal signal 

on diffusion weighted sequences.


2.  Diffuse cerebral atrophy and mild small vessel ischemic changes.


EVIDENCE OF ACUTE STROKE: YES.


 














Plan


Time Spent: Greater than 30 Minutes





Stroke


Is this a Stroke Patient?: Yes


Stroke Pt being discharged on Anti-thrombolytic therapy?: Yes


Stroke Pt being discharged on Anti-coagulation therapy?: Yes


Stroke Pt being discharged on Statins?: Yes





Acute Heart Failure


Is this a Heart Failure Patient?: No